# Patient Record
(demographics unavailable — no encounter records)

---

## 2024-11-08 NOTE — PHYSICAL EXAM
[Alert] : alert [Well Nourished] : well nourished [No Acute Distress] : no acute distress [Well Developed] : well developed [Normal Sclera/Conjunctiva] : normal sclera/conjunctiva [EOMI] : extra ocular movement intact [No Proptosis] : no proptosis [Thyroid Not Enlarged] : the thyroid was not enlarged [No Thyroid Nodules] : no palpable thyroid nodules [Well Healed Scar] : well healed scar [Clear to Auscultation] : lungs were clear to auscultation bilaterally [Normal S1, S2] : normal S1 and S2 [Normal Rate] : heart rate was normal [Regular Rhythm] : with a regular rhythm [No Edema] : no peripheral edema [Normal Bowel Sounds] : normal bowel sounds [Not Tender] : non-tender [Not Distended] : not distended [Soft] : abdomen soft [Normal Anterior Cervical Nodes] : no anterior cervical lymphadenopathy [No Spinal Tenderness] : no spinal tenderness [Spine Straight] : spine straight [No Stigmata of Cushings Syndrome] : no stigmata of Cushings Syndrome [Normal Gait] : normal gait [Normal Reflexes] : deep tendon reflexes were 2+ and symmetric [No Tremors] : no tremors [Oriented x3] : oriented to person, place, and time [de-identified] : partial plate  [de-identified] : parathyroidectomy scar

## 2024-11-08 NOTE — PROCEDURE
[Fine Needle Aspiration] : Fine needle aspiration ~T ~C was performed. [Risks] : risks [Patient] : the patient [Benefits] : benefits [Alternatives] : alternatives [Ethyl Chloride] : ethyl chloride [Supine] : The patient was placed in the supine position with the neck extended as tolerated. [Betadine] : with betadine solution [25 gauge 1.5 inch] : A 25 gauge 1.5 inch needle was used [3 Passes] : 3 passes were made through the mass [Ultrasonic Guidance] : ultrasound guidance was employed [Sent to Histology] : The specimens were prepared in the usual manner and sent to histology. [Tolerated Well] : the patient tolerated the procedure well [Hemostasis] : hemostasis was assured and the patient was discharged in satisfactory condition [No Complications] : There were no complications [Instructions Given] : Handouts/patient instructions were given to patient [FreeTextEntry1] : Bone Mineral Density: 10/23/2024 Indication: Comparison to 2023, assess response to medication Spine: L1-3, -3.8, osteoporosis, -7.7% although stable vs 2022. Note some DJD      Total hip: -2.1, osteopenia, -7.8% Femoral neck: -2.1, osteopenia, no significant change Proximal radius: -2.4, osteopenia, no significant change   Bone Density 2/15/2023 Indication vs 2022 Asses response to medication  Spine  L1- L3 -3.4 osteoporosis +9.0%  Total Hip -1.6 osteopenia +14.8%  Femoral Neck -2.3 osteopenia 26.4%  Proximal Radius -2.6 osteoporosis -5.1%   Bone mineral density: 01/19/2022  Indication: vs. outside study 2021 prior test incorrectly analyzed Spine: (L1-L3) -3.9 osteoporosis Total hip: -2.4 osteopenia, prior -1.5 osteopenia, although poorly positioned Femoral neck: -3.4 osteoporosis, prior -2.2 osteopenia, although poorly positioned Proximal radius: -2.1 osteopenia, no significant change vs. 2017  Bone mineral density of the forearm, no fee, done because of hyperparathyroidism. Proximal radius -1.9, osteopenia.  Bone mineral density: 2021 Spine: L1 -4.2, L2 -4.0, L3 -3.1, osteoporosis, decreased vs. 2018, addition of L4 shows incorrect analysis Total hip: -1.5 osteopenia, although poorly positioned Femoral neck: -2.2 osteopenia, increased vs. 2018, although poorly positioned

## 2024-11-08 NOTE — ASSESSMENT
[Other____] : Risks and benefits of [unfilled] was discussed with the patient. [FreeTextEntry1] : Complicated 72 y/o female with osteoporosis and hyperparathyroidism   Osteoporosis: Initially seen in 10/2017 for second opinion concerning osteoporosis and hyperparathyroidism. The patient has been told of osteoporosis for many years. BMD 2015 reported severe disease T score -4.1 spine. BMD 2017 reported spine -3.4, Hip -2.1. The images are not available. The proximal radius was not reported. MRI 2017 reports compression deformities of superior endplate of L2, lower endplate of L3, and upper endplate of L4 and L5. The patient did have a previous wrist fracture. Fh/o osteoporosis in brother. She tried Actonel but did not tolerate it due to upper GI side effects. The patient did have a previous wrist fracture. She tried Actonel but did not tolerate it due to upper GI side effects. She started Prolia in 2018 w/ Dr. Charles Hickman. Tolerated well. However she d/c Prolia spring 2021 due to need for dental work. Pt did not restart Prolia due to fear of future dental side effects.  BMD 01/2022 indicates severely low osteoporosis in spine, osteopenia in total hip and proximal radius, and low osteoporosis in femoral neck. BMD results reviewed w/pt.   Pt began Evenity February 2022, saw Dr Tompkins for convenience aand completed a full year 01/2023. BMD 02/2023 indicates improved osteoporosis in the spine, improved osteopenia in total hip and fem neck, worsened osteoporosis in prox. radius. BMD results reviewed w/pt. Pt never transitioned to Prolia after completing a year on Evenity. BMD 10/2024 indicates worsened osteoporosis in the spine although stable vs 2022, worsened osteopenia in total hip, stable osteopenia in fem neck and prox. radius. BMD results reviewed w/pt. Bone density worsened in the spine and total hip, recommend restarting medical therapy. Discussed options of restarting Prolia. Pt declined as she states it made her achy. Recommended starting delayed release Risedronate as pt had UGI sx in the past on oral bisphosphonates.  All questions were answered. Rx information handout provided. The patient understands and elects to start delayed release Risedronate.   Hyperparathyroidism: The patient has a history of hyperparathyroidism in the setting of previous gastric bypass surgery and low vitamin D levels in the past. The patient did have a single gland parathyroidectomy in 2013 but I do not have the details.  H/o hypothyroidism, patient appears clinically and chemically euthyroid on levothyroxine 75 mcg daily. No local neck pain. No dysphagia or dysphonia. No raciness, shakiness, heat/cold intolerance, tiredness, or fatigue. No palpitations, tremors, or sudden weight gain/loss.  Call Dr. Terry Tompkins for labs [phone]  Follow up in 6 months  Repeat BMD in 1 year

## 2024-11-08 NOTE — HISTORY OF PRESENT ILLNESS
[Risedronate (Actonel)] : Risedronate [Denosumab (Prolia)] : Denosumab [FreeTextEntry1] : Patient returns for a follow up visit for osteoporosis and hyperparathyroidism. After completing a year on Evenity, pt has not been on any osteoporosis rx. Pt has been receiving epidural injections in lower spine. No major surgeries, hospitalizations, fractures or changes in medication. Up to date with dentist. No major dental work planned.  Osteoporosis: Initially seen in 10/2017 for second opinion concerning osteoporosis and hyperparathyroidism. The patient has been told of osteoporosis for many years. BMD 2015 reported severe disease T score -4.1 spine. BMD 2017 reported spine -3.4, Hip -2.1. The images are not available. The proximal radius was not reported. MRI 2017 reports compression deformities of superior endplate of L2, lower endplate of L3, and upper endplate of L4 and L5. The patient did have a previous wrist fracture. Fh/o osteoporosis in brother. She tried Actonel but did not tolerate it due to upper GI side effects. The patient did have a previous wrist fracture. She tried Actonel but did not tolerate it due to upper GI side effects. She started Prolia in 2018 w/ Dr. Charles Hickman. Tolerated well. However she d/c Prolia spring 2021 due to need for dental work. Pt did not restart Prolia due to fear of future dental side effects.  BMD 01/2022 indicates severely low osteoporosis in spine, osteopenia in total hip and proximal radius, and low osteoporosis in femoral neck. BMD results reviewed w/pt.   Pt began Evenity February 2022, saw Dr Tompkins for convenience aand completed a full year 01/2023. BMD 02/2023 indicates improved osteoporosis in the spine, improved osteopenia in total hip and fem neck, worsened osteoporosis in prox. radius. BMD results reviewed w/pt. Pt never transitioned to Prolia after completing a year on Evenity. BMD 10/2024 indicates worsened osteoporosis in the spine although stable vs 2022, worsened osteopenia in total hip, stable osteopenia in fem neck and prox. radius. BMD results reviewed w/pt.   Hyperparathyroidism: The patient was diagnosed with primary hyperparathyroidism and underwent a single gland parathyroid surgery approximately 2013. She had an elevated PTH of 101 with a high normal calcium 10.1 and a normal 25 vitamin D of 35. She denies any h/o kidney stones.  Prior endocrine history is notable for mild hypothyroidism currently on levothyroxine 75 mcg daily. No local neck pain. No dysphagia or dysphonia. No raciness, shakiness, heat/cold intolerance, tiredness, or fatigue. No palpitations, tremors, or sudden weight gain/loss. She has no history of goiter, no history of exposure to radiation therapy or lithium. No family history of thyroid disease.  Medical history is notable for gastric bypass surgery 2008 with approximately 70-pound weight loss.   H/o mastectomy 1996 for breast CA. No chemo or RT.

## 2024-11-08 NOTE — END OF VISIT
[FreeTextEntry3] : This note was written by Diann Jimenez on ( February 15, 2023) acting as a medical scribe for Dr. Marte.  This note was authored by the medical scribe for me. I have reviewed, edited, and revised the note as needed. I am in agreement with the exam findings, imaging findings, and treatment plan.  Marvin Marte MD

## 2024-11-08 NOTE — REASON FOR VISIT
[Follow - Up] : a follow-up visit [Osteoporosis] : osteoporosis [Hyperparathyroidism] : hyperparathyroidism [Spouse] : spouse